# Patient Record
Sex: FEMALE | Race: WHITE | Employment: UNEMPLOYED | URBAN - METROPOLITAN AREA
[De-identification: names, ages, dates, MRNs, and addresses within clinical notes are randomized per-mention and may not be internally consistent; named-entity substitution may affect disease eponyms.]

---

## 2017-02-22 ENCOUNTER — HOSPITAL ENCOUNTER (OUTPATIENT)
Dept: ULTRASOUND IMAGING | Age: 47
Discharge: HOME | End: 2017-02-22
Attending: NURSE PRACTITIONER
Payer: SELF-PAY

## 2017-02-22 ENCOUNTER — HOSPITAL ENCOUNTER (OUTPATIENT)
Dept: MAMMOGRAPHY | Age: 47
Discharge: HOME | End: 2017-02-22
Attending: NURSE PRACTITIONER
Payer: SELF-PAY

## 2017-02-22 DIAGNOSIS — N63.0 BREAST MASS IN FEMALE: ICD-10-CM

## 2017-02-22 PROCEDURE — 76642 ULTRASOUND BREAST LIMITED: CPT

## 2017-02-22 PROCEDURE — 77066 DX MAMMO INCL CAD BI: CPT

## 2020-03-09 ENCOUNTER — OFFICE VISIT (OUTPATIENT)
Dept: OBGYN CLINIC | Age: 50
End: 2020-03-09

## 2020-03-09 ENCOUNTER — HOSPITAL ENCOUNTER (OUTPATIENT)
Dept: LAB | Age: 50
Discharge: HOME | End: 2020-03-09

## 2020-03-09 VITALS
SYSTOLIC BLOOD PRESSURE: 120 MMHG | DIASTOLIC BLOOD PRESSURE: 84 MMHG | BODY MASS INDEX: 30.63 KG/M2 | WEIGHT: 156 LBS | HEIGHT: 60 IN

## 2020-03-09 DIAGNOSIS — R35.0 URINARY FREQUENCY: Primary | ICD-10-CM

## 2020-03-09 DIAGNOSIS — N63.0 BREAST MASS: ICD-10-CM

## 2020-03-09 DIAGNOSIS — R10.2 PELVIC PAIN IN FEMALE: ICD-10-CM

## 2020-03-09 DIAGNOSIS — R35.0 URINARY FREQUENCY: ICD-10-CM

## 2020-03-09 LAB
BILIRUB UR QL STRIP: NEGATIVE
GLUCOSE UR-MCNC: NEGATIVE MG/DL
KETONES P FAST UR STRIP-MCNC: NEGATIVE MG/DL
PH UR STRIP: 4.6 [PH] (ref 4.6–8)
PROT UR QL STRIP: NEGATIVE
SP GR UR STRIP: 1 (ref 1–1.03)
UA UROBILINOGEN AMB POC: NORMAL (ref 0.2–1)
URINALYSIS CLARITY POC: NORMAL
URINALYSIS COLOR POC: NORMAL
URINE BLOOD POC: NEGATIVE
URINE LEUKOCYTES POC: NEGATIVE
URINE NITRITES POC: NEGATIVE

## 2020-03-09 RX ORDER — TRAMADOL HYDROCHLORIDE 50 MG/1
50 TABLET ORAL
COMMUNITY

## 2020-03-09 RX ORDER — FLUOXETINE 10 MG/1
10 TABLET ORAL DAILY
COMMUNITY

## 2020-03-09 NOTE — PROGRESS NOTES
Pelvic Pain evaluation    Izzy Stallings is a 52 y.o. female who complains of pelvic pain. The pain is described as aching, constant and cramping. Pain is located in the suprapubic without radiation. The pain started several years ago. Her symptoms have been unchanged since. Aggravating factors: urinary frequency    Alleviating factors: none. The patient denies fever. Past Medical History:   Diagnosis Date    Anxiety     Arrhythmia     pt states gets out of rhythm    Breast cyst     both sides    Breast lump 2017    left side has multiple lumps, been there for a few months    Depression     Irritable bowel syndrome (IBS)     Nausea & vomiting     with both surgeries patient experiences N/V     Past Surgical History:   Procedure Laterality Date    HX ADENOIDECTOMY      HX GI  12/31/2014    fistulotomy    HX HYSTERE[address]OMY  09/29/[address]    [address]    HX TONSILLECTOMY      HX TUBAL LIGATION       Social History     Occupational History    Not on file   Tobacco Use    Smoking status: Never Smoker    Smokeless tobacco: Never Used   Substance and Sexual Activity    Alcohol use: No    Drug use: No    Sexual activity: Not on file     Family History   Problem Relation Age of Onset    Hypertension Father     Coronary Artery Disease Father     COPD Mother     Diabetes Maternal Grandmother     Breast Cancer Maternal Grandmother     Ovarian Cancer Maternal Aunt 27    Colon Cancer Neg Hx        Allergies   Allergen Reactions    Adhesive Tape-Silicones Contact Dermatitis     Prior to Admission medications    Medication Sig Start Date End Date Taking? Authorizing Provider   FLUoxetine (PROZAC) 10 mg tablet Take 10 mg by mouth daily. Yes Provider, Historical   traMADoL (ULTRAM) 50 mg tablet Take 50 mg by mouth every six (6) hours as needed for Pain. Yes Provider, Historical   CLONAZEPAM PO Take  by mouth.    Yes Provider, Historical   HYDROmorphone (DILAUDID) 2 mg tablet Take 1 Tab by mouth every four (4) hours as needed. Max Daily Amount: 12 mg. 5/8/15   Jodi Langford MD   oxyCODONE-acetaminophen (PERCOCET) 5-325 mg per tablet Take 1 Tab by mouth every four (4) hours as needed. Max Daily Amount: 6 Tabs. 5/8/15   Linda East MD   polyethylene glycol (MIRALAX) 17 gram packet Take 1 Packet by mouth daily. 5/8/15   Linda East MD   cyclobenzaprine (FLEXERIL) 10 mg tablet Take 10 mg by mouth three (3) times daily as needed for Muscle Spasm(s). Provider, Historical   LORazepam (ATIVAN) 0.5 mg tablet Take 0.5 mg by mouth every eight (8) hours as needed for Anxiety. Provider, Historical   oxyCODONE-acetaminophen (PERCOCET) 5-325 mg per tablet Take 1-2 Tabs by mouth every four (4) hours as needed for Pain. Max Daily Amount: 12 Tabs. 2/8/15   Linda East MD   magnesium hydroxide (MILK OF MAGNESIA) 400 mg/5 mL suspension Take 30 mL by mouth as needed for Constipation. 1/1/15   Linda East MD   ibuprofen (MOTRIN) 200 mg tablet Take 2 tablets by mouth every eight (8) hours as needed for Pain. Patient taking differently: Take 800 mg by mouth every eight (8) hours as needed for Pain. 1/1/15   Linda East MD   citalopram (CELEXA) 40 mg tablet Take 40 mg by mouth daily.     Provider, Historical        Review of Systems: History obtained from the patient  Constitutional: negative for weight loss, fever, night sweats  Breast: negative for nipple discharge, galactorrhea, + left breast lump  GI: negative for change in bowel habits, abdominal pain, black or bloody stools  : negative for frequency, dysuria, hematuria, vaginal discharge  MSK: negative for back pain, joint pain, muscle pain  Skin: negative for itching, rash, hives  Psych: negative for anxiety, depression, change in mood      Objective:    Visit Vitals  /84   Ht 5' (1.524 m)   Wt 156 lb (70.8 kg)   BMI 30.47 kg/m²       Physical Exam:     Constitutional  · Appearance: well-nourished, well developed, alert, in no acute distress    Gastrointestinal  · Abdominal Examination: abdomen tender to palpation in suprapubic area, normal bowel sounds, no masses present  · Liver and spleen: no hepatomegaly present, spleen not palpable  · Hernias: no hernias identified    Genitourinary  · External Genitalia: normal appearance for age, no discharge present, no tenderness present, no inflammatory lesions present, no masses present, no atrophy present  · Vagina: normal vaginal vault without central or paravaginal defects, no discharge present, no inflammatory lesions present, no masses present  · Bladder: non-tender to palpation  · Urethra: appears normal  · Cervix: normal   · Uterus: normal size, shape and consistency  · Adnexa: no adnexal tenderness present, no adnexal masses present  · Perineum: perineum within normal limits, no evidence of trauma, no rashes or skin lesions present  · Anus: anus within normal limits, no hemorrhoids present  · Inguinal Lymph Nodes: no lymphadenopathy present    Skin  · General Inspection: no rash, no lesions identified    Neurologic/Psychiatric  · Mental Status:  · Orientation: grossly oriented to person, place and time  · Mood and Affect: mood normal, affect appropriate    Assessment/Plan:  Pelvic Pain- will send urine for culture, referred to urogyn as may be related to vaginal mesh placed at the time of her hyst.  Will rto for ultrasound. Rectovaginal fistula- referred to colorectal  Left breast mass- will schedule diagnostic bilateral mammogram and referral to breast surgeon. RTO prn if symptoms persist or worsen. Instructions given to pt. Handouts given to pt.

## 2020-03-11 LAB
BACTERIA SPEC CULT: NORMAL
SERVICE CMNT-IMP: NORMAL

## 2021-02-24 ENCOUNTER — TELEPHONE (OUTPATIENT)
Dept: RHEUMATOLOGY | Age: 51
End: 2021-02-24

## 2021-03-17 ENCOUNTER — TELEPHONE (OUTPATIENT)
Dept: RHEUMATOLOGY | Age: 51
End: 2021-03-17

## 2021-03-24 ENCOUNTER — TELEPHONE (OUTPATIENT)
Dept: RHEUMATOLOGY | Age: 51
End: 2021-03-24

## 2021-03-25 ENCOUNTER — OFFICE VISIT (OUTPATIENT)
Dept: RHEUMATOLOGY | Age: 51
End: 2021-03-25

## 2021-03-25 VITALS
DIASTOLIC BLOOD PRESSURE: 71 MMHG | BODY MASS INDEX: 27.34 KG/M2 | RESPIRATION RATE: 18 BRPM | SYSTOLIC BLOOD PRESSURE: 105 MMHG | TEMPERATURE: 97.8 F | HEART RATE: 68 BPM | WEIGHT: 140 LBS

## 2021-03-25 DIAGNOSIS — Z79.60 LONG-TERM USE OF IMMUNOSUPPRESSANT MEDICATION: ICD-10-CM

## 2021-03-25 DIAGNOSIS — M13.0 POLYARTHRITIS: Primary | ICD-10-CM

## 2021-03-25 DIAGNOSIS — K52.9 COLITIS WITH FISTULA: ICD-10-CM

## 2021-03-25 DIAGNOSIS — K63.2 COLITIS WITH FISTULA: ICD-10-CM

## 2021-03-25 DIAGNOSIS — L40.50 PSORIATIC ARTHRITIS (HCC): ICD-10-CM

## 2021-03-25 PROCEDURE — 99205 OFFICE O/P NEW HI 60 MIN: CPT | Performed by: INTERNAL MEDICINE

## 2021-03-25 RX ORDER — BACLOFEN 10 MG/1
TABLET ORAL 3 TIMES DAILY
COMMUNITY

## 2021-03-25 RX ORDER — GABAPENTIN 100 MG/1
CAPSULE ORAL DAILY
COMMUNITY

## 2021-03-25 NOTE — PROGRESS NOTES
REASON FOR VISIT    This is the initial evaluation for Ms. Oumou Esquivel a 54 y.o. WHITE female for question of a seronegative spondyloarthritis. The patient is referred to the [place] at the request of Dr. Anita Bo. HISTORY OF PRESENT ILLNESS     I have reviewed and summarized old records from Dr. Anita Bo, [facility] office notes, [facility], [facility], [facility], [facility] and [facility] ([facility])    She has a history of recurrent right gluteal abscess, bladder prolapse, toe nail onychomycosis. In 5/07/2015, Anal Canal, excision: Marked acute and chronic inflammation with vascular hyperplasia. In 7/23/2019, labs showed negative rheumatoid factor. In 11/05/2020, labs showed uric acid 6.5 mg/dL, negative rheumatoid factor. In 2/25/2021, labs showed WBC 6.6, lymphocytes 2.6, Hct 36.8%, platelets 631,145, creatinine 0.85 mg/dL, eGFR 81, albumin 4.3 g/dL, ALK 88 U/L, ALT 25 U/L, AST 22 U/L, CRP 2 mg/L. Today, she complains of everything. She has pain in her hands, elbows, low back, and feet. She has diffuse aching pain in the morning that may improve as the day goes on but then has recurrence in the evening. Hot shower does not help. NSAIDs does not help. She was given Xiomara and has taken it for 2 days but has not noticed any benefit. She feel swelling in her hands and toes that is constant. She feels stiffness lasting at least 1 to 2 hours. This has going on for years. 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 She reports this has happened after falling down the stairs complicated with deep vein thrombosis and abscess and has never healed. She has had surgery several times. She had a colonoscopy several years ago    She has tingling in her feet to her back when she sits for a while. She feels tingling and numbness in upper extremities right more than left.  She has not seen a neurologist.    She has pain in chest with deep breaths, pain in chest when lying down, sudden changes in heart beat. She has not seen a cardiologist.     She has nausea, chronic constipation, chronic diarrhea. She had an EGD last year. Therapy History includes:  Current NSAIDs include: none  Current DMARD include: none  Prior DMARD therapy includes: none  The following DMARDs have been ineffective: none  The following DMARDs were stopped because of side effects: none    REVIEW OF SYSTEMS    A 15 point review of systems was performed and summarized below. The questionnaire was reviewed with the patient and scanned into the patient's medical record.     General: endorses recent weight loss, fatigue, weakness, drenching night sweats, denies recent weight gain, fever  Musculoskeletal: endorses joint pain, joint swelling, muscle pain, denies morning stiffness   Ears: denies ringing in ears, hearing loss, deafness  Eyes: denies pain, light sensitive, redness, blindness, double vision, blurred vision, excess tearing, dryness, foreign body sensation  Mouth: endorses sore tongue, denies oral ulcers, loss of taste, dryness, increased dental caries  Nose: denies nosebleeds, nasal ulcers  Throat: denies food stuck when swallowing, difficulty with swallowing, hoarseness, pain in jaw while chewing  Neck: endorses tender glands, denies swollen glands  Cardiopulmonary: endorses pain in chest with deep breaths, pain in chest when lying down, sudden changes in heart beat, denies murmurs,  wheezing, dry cough, productive cough, shortness of breath at rest, shortness of breath on exertion, coughing of blood  Gastrointestinal: endorses nausea, chronic constipation, chronic diarrhea, denies heartburn, stomach pain relieved by food, blood in stools, black stools  Genitourinary: endorses vaginal dryness, pain or burning on urination, cloudy urine, denies blood in urine, vaginal ulcers   Hematologic: denies anemia, bleeding tendency, blood clots, bleeding gums  Skin: endorses easy bruising, nodules/bumps, color changes of hands or feet in the cold (Raynaud's), denies hair loss, rash, rash worsened after sun exposure, hives/urticaria, skin thickening, skin tightness  Neurologic: endorses numbness or tingling in hands, numbness or tingling in feet, muscle weakness  Psychiatric: endorses excessive worries, denies depression, PTSD, Bipolar  Sleep: endorses poor sleep, difficulty falling asleep, difficulty staying asleep, denies snoring, apnea, daytime somnolence     PAST MEDICAL HISTORY    She has a past medical history of Anal fistula, Anxiety, Arrhythmia, Breast cyst, Breast lump (2017), Depression, Female bladder prolapse, Heart attack ([address] Irritable bowel syndrome (IBS), and Nausea & vomiting. FAMILY HISTORY    Her family history includes Arthritis-rheumatoid in her father and sister; Breast Cancer in her maternal grandmother; COPD in her mother; Coronary Artery Disease in her father; Diabetes in her maternal grandmother; Hypertension in her father; Other in her father and sister; Ovarian Cancer (age of onset: 27) in her maternal aunt; Ulcerative Colitis in her father and mother. SOCIAL HISTORY    She reports that she has never smoked. She has never used smokeless tobacco. She reports current alcohol use. She reports that she does not use drugs. MEDICATIONS    Current Outpatient Medications   Medication Sig    baclofen (LIORESAL) 10 mg tablet Take  by mouth three (3) times daily.  gabapentin (NEURONTIN) 100 mg capsule Take  by mouth daily.  FLUoxetine (PROZAC) 10 mg tablet Take 10 mg by mouth daily.  traMADoL (ULTRAM) 50 mg tablet Take 50 mg by mouth every six (6) hours as needed for Pain.  CLONAZEPAM PO Take  by mouth.  LORazepam (ATIVAN) 0.5 mg tablet Take 0.5 mg by mouth every eight (8) hours as needed for Anxiety.  ibuprofen (MOTRIN) 200 mg tablet Take 2 tablets by mouth every eight (8) hours as needed for Pain.  (Patient taking differently: Take 800 mg by mouth every eight (8) hours as needed for Pain.)    citalopram (CELEXA) 40 mg tablet Take 40 mg by mouth daily. No current facility-administered medications for this visit. ALLERGIES    Allergies   Allergen Reactions    Adhesive Tape-Silicones Contact Dermatitis    Buspar [Buspirone] Other (comments)     Asphyxiation       PHYSICAL EXAMINATION    Visit Vitals  /71   Pulse 68   Temp 97.8 °F (36.6 °C)   Resp 18   Wt 140 lb (63.5 kg)   BMI 27.34 kg/m²     Body mass index is 27.34 kg/m². General: Patient is alert, oriented x 3, not in acute distress    HEENT:   Conjunctiva are not injected and appear moist, oral mucous membranes are moist, there is no alopecia, neck is supple    Cardiovascular:  Heart is regular rate and rhythm, no murmurs. Chest:  Lungs are clear to auscultation bilaterally. Extremities:  Free of clubbing, cyanosis, edema, extremities well perfused. Neurological:  Muscle strength is full in upper and lower extremities. Skin:    Psoriasis:     yes (possible on knees)  Nail Pitting:     no  Onycholysis:     no  Palmoplantar pustulosis:   no  Splinter Hemorrhage:    no  Leukonychia:     yes (bilateral finger nails, left more than right)  Acne fulminans:    no  Acne conglobata:    no  Hidradenitis Suppurativa:   no  Dissecting cellulitis of the scalp:  no  Pilonidal sinus:    no  Erythema nodosum:    no    Right 2nd and 3rd toe nail avulsion    Musculoskeletal:  A comprehensive musculoskeletal exam was performed for all joints of each upper and lower extremity and assessed for swelling, tenderness and range of motion.       Diffuse allodynia  Tenderness along the entirety of the spine and SI joint  Interphalangeal tenderness  Bilateral lateral epicondylitis tenderness  Blateral Cristine and Heberden nodes  Right IT band and trochanteric bursa tenderness  Right achilles tenderness  Right toes dactylyitic    LUIS M: normal  Straight Leg Raise: N/A    Costochondritis:  no   Synovitis:   no  Dactylitis:   yes (right toes)  Enthesitis:   yes (bilateral lateral epicondyle, right Achilles)    No flowsheet data found. DATA REVIEW    Prior medical records were reviewed and are summarized as below:    Laboratory data: summarized in the HPI    Imaging: summarized in the HPI. ASSESSMENT AND PLAN    1) Possible Psoriatic Arthritis. (possible knee psoriasis, leukonychia, enthesitis, dactylitis)    She has evidence of right foot dactylitis on exam. Her 2nd and 3rd toe nail were removed for onychomycosis noted on scraping. She as diffuse allodynia on exam suggestive for fibromyalgia. She was given Xiomara unknown dose and has been on it for 2 days without much benefit. She also has chronic low back pain. 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 She reports this has happened after falling down the stairs complicated with deep vein thrombosis and abscess and has never healed. She has had surgery several times. She had a colonoscopy several years ago. I wonder if she has inflammatory bowel disease/colitis with fistula, which may occur with seronegative spondyloarthropathies. I will refer her to GI. I think she psoriatic arthritis. I ordered labs and radiographs and will discuss treatment when I have those results. ADDENDUM    Labs were normal/negative. Foot radiograph showed Achilles enthesitis. We discussed initiation of DMARD therapy with methotrexate. It is a weekly regimen that is supplemented with daily folic acid to help counteract potential side effects. I discussed with the patient the potential adverse effects, which include: nausea, vomiting, dyspepsia, oral ulcers, hair thinning, infection, liver function abnormalities, blood count abnormalities, and rarely pneumonitis or melanoma. The patient understood these possible adverse effects. I informed the patient of the need for routine CBC and CMP as a measure for long term use of immunosuppressants.  I also instructed the patient to avoid ill contacts and the needs for annual influenza vaccines and the pneumonia vaccines. I asked the patient to apply SPF 50 sunscreen when out in the sun. The patient was also instructed to avoid alcohol as it may hasten hepatotoxicity. In childbearing patients, pregnancy is contra-indicated while on methotrexate due to risk for birth defects and early termination. I prescribed methotrexate 20 mg every Wednesday with daily folic acid 1 mg with instructions to take 15 mg once and if tolerated, to increase to 20 mg the following week. I informed the patient that methotrexate may take 6 to 12 weeks to be effective. Patient was informed to contact me if they develop any adverse reaction or infection. I ordered labs to be drawn before the 4th dose of methotrexate. 2) Fibromyalgia. Her history, constellation of symptoms, and examination are consistent with a pain syndrome. Fibromyalgia is a disease characterized by chronic widespread musculoskeletal pain. Fibromyalgia is caused by abnormal processing of pain signals in the central nervous system, leading to exaggerated pain responses. Non-pharmacologic therapies such as cardiovascular exercise and Cognitive Behavioral Therapy have been shown to be of benefit ([facility], Am J Med 2009). David Chi in particular has proven efficacy in the treatment of fibromyalgia Hassanophelia Rayo, NEJ 2010). If pharmacotherapy is pursued, pregabalin (Lyrica), gabapentin (Neurontin), milnacipran (Wilson Hallock), and duloxetine (Cymbalta) are FDA approved medications for the treatment of fibromyalgia. Narcotics have not been proven to be efficacious in the treatment of fibromyalgia. In fact, narcotic use in this patient population has been observed to exacerbate depression, and may enhance the hyperalgesia which is characteristic of this condition (Kimmy Klein Rheum 2006). They also are at increased risk for opioid-induced hyperalgesia due predominantly to central sensitization Mana MAYES et al. [facility] Clin Rheumatol.  2013 Mar;19(2):72-7). Specifically, a double-blind placebo-controlled trial by Jessa Browne al published in 1995 demonstrated that intravenous morphine did not reduce pain in fibromyalgia patients. A study by Clifford Aguilar al published in 2003 showed that fibromyalgia patients taking oral opiates did not experience improvement in their pain at four years of follow up, and also reported increased depression over the last two years of the study. There is subsequent concern that the prolonged use of narcotics to treat fibromyalgia may cause harm to these patients Shoaib Campuzano, Pain 2005). Finally, opioid use in fibromyalgia had poorer symptoms and functional and occupational status compared to nonusers (Kevin PIERRE et al. Pain Res Treat. 3235;2365:977429). We therefore recommend that narcotics be avoided in all patients with fibromyalgia. We recommend David Chi stretching exercises for at least 30 minutes per day. The [employer] has made a videotape of David Chi that She can borrow from [employer], purchase online or watch for free on [email or web address]. [employer] David Chi for Arthritis. 3) Paresthesia. She has tingling in her feet to her back when she sits for a while. She feels tingling and numbness in upper extremities right more than left. I recommend she be referred to a neurologist.  I defer to her PCP. 4) Cardiac Symptoms. She has pain in chest with deep breaths, pain in chest when lying down, sudden changes in heart beat. I recommend she be referred to a cardiologist. I defer to her PCP. 5) IBS. She has nausea, chronic constipation, chronic diarrhea. She had an EGD last year. The patient voiced understanding of the aforementioned assessment and plan. A total of 70 minutes was spent on this visit, reviewing interval notes, interval testing results, ordering tests, refilling medications, documenting the findings in the note, patient education, counseling, and coordination of care as described above.  All questions asked and answered. TODAY'S ORDERS    Orders Placed This Encounter    QUANTIFERON-TB GOLD PLUS    XR FOOT LT MIN 3 V    XR FOOT RT MIN 3 V    XR HAND LT MIN 3 V    XR HAND RT MIN 3 V    XR SPINE LUMB 2 OR 3 V    XR SI JTS MIN 3 V    CYCLIC CITRUL PEPTIDE AB, IGG    CBC WITH AUTOMATED DIFF    CHRONIC HEPATITIS PANEL    METABOLIC PANEL, COMPREHENSIVE    C REACTIVE PROTEIN, QT    SED RATE (ESR)    HLA-B27    RHEUMATOID FACTOR BY TURB (RDL)    PROTEIN ELECTROPHORESIS W/ REFLX LINDA    COMMENT    METABOLIC PANEL, COMPREHENSIVE    CBC WITH AUTOMATED DIFF    [facility]    methotrexate (RHEUMATREX) 2.5 mg tablet    folic acid (FOLVITE) 1 mg tablet     No future appointments.     Camille Marie MD, [facility]    Adult Rheumatology   Rheumatology Ultrasound Certified  [address] [facility] [address] [facility], [address] [place] [address]   Phone [phone]  Fax [phone]    Patient Care Team:  Jacob Garcia NP as PCP - General (Nurse Practitioner)  Jayshree Forde DPM as Consulting Provider (Podiatry)

## 2021-03-25 NOTE — LETTER
3/26/2021 Patient: Stacie Gallegos YOB: 1970 Date of Visit: 3/25/2021 Dortha Buerger, NP 
[facility] [address] [facility] [address] [address] Fax: [phone] Dear Dortha Buerger, NP, Thank you for referring Ms. Mayra Lewis to [address] [place] [address] for evaluation. My notes for this consultation are attached. If you have questions, please do not hesitate to call me. I look forward to following your patient along with you.  
 
 
Sincerely, 
 
Ky Herring MD

## 2021-04-05 LAB
ALBUMIN SERPL ELPH-MCNC: 3.9 G/DL (ref 2.9–4.4)
ALBUMIN SERPL-MCNC: 4.5 G/DL (ref 3.8–4.8)
ALBUMIN/GLOB SERPL: 1.6 {RATIO} (ref 0.7–1.7)
ALBUMIN/GLOB SERPL: 2.4 {RATIO} (ref 1.2–2.2)
ALP SERPL-CCNC: 81 IU/L (ref 39–117)
ALPHA1 GLOB SERPL ELPH-MCNC: 0.2 G/DL (ref 0–0.4)
ALPHA2 GLOB SERPL ELPH-MCNC: 0.6 G/DL (ref 0.4–1)
ALT SERPL-CCNC: 16 IU/L (ref 0–32)
AST SERPL-CCNC: 20 IU/L (ref 0–40)
B-GLOBULIN SERPL ELPH-MCNC: 0.9 G/DL (ref 0.7–1.3)
BASOPHILS # BLD AUTO: 0 X10E3/UL (ref 0–0.2)
BASOPHILS NFR BLD AUTO: 1 %
BILIRUB SERPL-MCNC: 0.2 MG/DL (ref 0–1.2)
BUN SERPL-MCNC: 9 MG/DL (ref 6–24)
BUN/CREAT SERPL: 11 (ref 9–23)
CALCIUM SERPL-MCNC: 9.4 MG/DL (ref 8.7–10.2)
CCP IGA+IGG SERPL IA-ACNC: 3 UNITS (ref 0–19)
CHLORIDE SERPL-SCNC: 106 MMOL/L (ref 96–106)
CO2 SERPL-SCNC: 19 MMOL/L (ref 20–29)
COMMENT, 144067: NORMAL
CREAT SERPL-MCNC: 0.8 MG/DL (ref 0.57–1)
CRP SERPL-MCNC: 1 MG/L (ref 0–10)
EOSINOPHIL # BLD AUTO: 0.1 X10E3/UL (ref 0–0.4)
EOSINOPHIL NFR BLD AUTO: 1 %
ERYTHROCYTE [DISTWIDTH] IN BLOOD BY AUTOMATED COUNT: 11.8 % (ref 11.7–15.4)
ERYTHROCYTE [SEDIMENTATION RATE] IN BLOOD BY WESTERGREN METHOD: 2 MM/HR (ref 0–32)
GAMMA GLOB SERPL ELPH-MCNC: 0.9 G/DL (ref 0.4–1.8)
GAMMA INTERFERON BACKGROUND BLD IA-ACNC: 0.21 IU/ML
GLOBULIN SER CALC-MCNC: 1.9 G/DL (ref 1.5–4.5)
GLOBULIN SER CALC-MCNC: 2.5 G/DL (ref 2.2–3.9)
GLUCOSE SERPL-MCNC: 78 MG/DL (ref 65–99)
HBV CORE AB SERPL QL IA: NEGATIVE
HBV CORE IGM SERPL QL IA: NEGATIVE
HBV E AB SERPL QL IA: NEGATIVE
HBV E AG SERPL QL IA: NEGATIVE
HBV SURFACE AB SER QL: NON REACTIVE
HBV SURFACE AG SERPL QL IA: NEGATIVE
HCT VFR BLD AUTO: 39.1 % (ref 34–46.6)
HCV AB S/CO SERPL IA: <0.1 S/CO RATIO (ref 0–0.9)
HGB BLD-MCNC: 13.4 G/DL (ref 11.1–15.9)
HLA-B27 QL NAA+PROBE: NEGATIVE
IMM GRANULOCYTES # BLD AUTO: 0 X10E3/UL (ref 0–0.1)
IMM GRANULOCYTES NFR BLD AUTO: 0 %
LYMPHOCYTES # BLD AUTO: 1.7 X10E3/UL (ref 0.7–3.1)
LYMPHOCYTES NFR BLD AUTO: 30 %
M PROTEIN SERPL ELPH-MCNC: NORMAL G/DL
M TB IFN-G BLD-IMP: NEGATIVE
M TB IFN-G CD4+ BCKGRND COR BLD-ACNC: 0.18 IU/ML
MCH RBC QN AUTO: 30 PG (ref 26.6–33)
MCHC RBC AUTO-ENTMCNC: 34.3 G/DL (ref 31.5–35.7)
MCV RBC AUTO: 88 FL (ref 79–97)
MITOGEN IGNF BLD-ACNC: >10 IU/ML
MONOCYTES # BLD AUTO: 0.2 X10E3/UL (ref 0.1–0.9)
MONOCYTES NFR BLD AUTO: 4 %
NEUTROPHILS # BLD AUTO: 3.5 X10E3/UL (ref 1.4–7)
NEUTROPHILS NFR BLD AUTO: 64 %
PLATELET # BLD AUTO: 300 X10E3/UL (ref 150–450)
PLEASE NOTE, 011150: NORMAL
POTASSIUM SERPL-SCNC: 4.2 MMOL/L (ref 3.5–5.2)
PROT PATTERN SERPL ELPH-IMP: NORMAL
PROT SERPL-MCNC: 6.4 G/DL (ref 6–8.5)
QUANTIFERON INCUBATION, QF1T: NORMAL
QUANTIFERON TB2 AG: 0.12 IU/ML
RBC # BLD AUTO: 4.47 X10E6/UL (ref 3.77–5.28)
RHEUMATOID FACT SERPL-ACNC: <15 IU/ML (ref 0–15)
SERVICE CMNT-IMP: NORMAL
SODIUM SERPL-SCNC: 144 MMOL/L (ref 134–144)
WBC # BLD AUTO: 5.5 X10E3/UL (ref 3.4–10.8)

## 2021-04-06 RX ORDER — FOLIC ACID 1 MG/1
1 TABLET ORAL DAILY
Qty: 90 TAB | Refills: 5 | Status: SHIPPED | OUTPATIENT
Start: 2021-04-06

## 2021-04-06 RX ORDER — METHOTREXATE 2.5 MG/1
20 TABLET ORAL
Qty: 96 TAB | Refills: 0 | Status: SHIPPED | OUTPATIENT
Start: 2021-04-07

## 2021-09-07 ENCOUNTER — TELEPHONE (OUTPATIENT)
Dept: RHEUMATOLOGY | Age: 51
End: 2021-09-07

## 2021-09-22 ENCOUNTER — TELEPHONE (OUTPATIENT)
Dept: RHEUMATOLOGY | Age: 51
End: 2021-09-22

## 2021-09-28 ENCOUNTER — TELEPHONE (OUTPATIENT)
Dept: RHEUMATOLOGY | Age: 51
End: 2021-09-28

## 2021-09-30 ENCOUNTER — TELEPHONE (OUTPATIENT)
Dept: RHEUMATOLOGY | Age: 51
End: 2021-09-30

## 2021-10-05 ENCOUNTER — TELEPHONE (OUTPATIENT)
Dept: RHEUMATOLOGY | Age: 51
End: 2021-10-05